# Patient Record
Sex: MALE | Race: WHITE | NOT HISPANIC OR LATINO | URBAN - METROPOLITAN AREA
[De-identification: names, ages, dates, MRNs, and addresses within clinical notes are randomized per-mention and may not be internally consistent; named-entity substitution may affect disease eponyms.]

---

## 2017-05-16 ENCOUNTER — HISTORICAL (OUTPATIENT)
Dept: RADIOLOGY | Facility: HOSPITAL | Age: 57
End: 2017-05-16

## 2017-06-02 ENCOUNTER — HISTORICAL (OUTPATIENT)
Dept: SURGERY | Facility: HOSPITAL | Age: 57
End: 2017-06-02

## 2018-05-02 ENCOUNTER — HISTORICAL (OUTPATIENT)
Dept: LAB | Facility: HOSPITAL | Age: 58
End: 2018-05-02

## 2018-09-24 ENCOUNTER — HISTORICAL (OUTPATIENT)
Dept: RADIOLOGY | Facility: HOSPITAL | Age: 58
End: 2018-09-24

## 2018-09-28 ENCOUNTER — HISTORICAL (OUTPATIENT)
Dept: RADIOLOGY | Facility: HOSPITAL | Age: 58
End: 2018-09-28

## 2021-10-27 ENCOUNTER — HISTORICAL (OUTPATIENT)
Dept: ADMINISTRATIVE | Facility: HOSPITAL | Age: 61
End: 2021-10-27

## 2024-02-15 ENCOUNTER — RESEARCH ENCOUNTER (OUTPATIENT)
Dept: RESEARCH | Facility: HOSPITAL | Age: 64
End: 2024-02-15
Payer: MEDICARE

## 2024-02-15 DIAGNOSIS — Z00.6 RESEARCH SUBJECT: Primary | ICD-10-CM

## 2024-02-15 NOTE — RESEARCH
Sponsor: [employer]     Study Title/IRB Number: [id number]    Principle Investigator: Dr. Eusebio Joseph    Patient eligibility was checked prior to enrollment in the study. Patient met the following inclusion and exclusion criteria:     INCLUSION CRITERIA  Participant age is 50 years or older at the time of signing the Informed Consent form  Participant is capable of giving signed Informed Consent, which includes compliance with the requirements and restrictions in the informed consent form and the protocol    EXCLUSION CRITERIA  Participant is undergoing or referred for diagnostic evaluation due to clinical suspicion for cancer  Participant has a personal history of invasive or hematologic malignancy, diagnosed within the last 3 years prior to expected enrollment date or diagnosed greater than 3 years prior to expected enrollment date and never treated  Participant has had definitive treatment for invasive or hematologic malignancy within the 3 years prior to expected enrollment date  Participant is not able to comply with protocol procedures  Participant is not a current patient at a participating center  Participant is currently enrolled or was previously enrolled in another [employer]-sponsored study  Participant is current or previous employee/contractor of [employer]  Participant is currently pregnant (by participant's self-report of pregnancy status)    Prior to the Informed Consent (IC) being signed, or any study protocol required data collection, testing, procedure, or intervention being performed, the following was done and/or discussed:  Patient was given a copy of the IC for review   Purpose of the study and qualifications to participate   Study design, Follow up schedule, and tests or procedures done at each visit  Confidentiality and HIPAA Authorization for Release of Medical Records for the research trial/ subject's rights/research related injury  Risk, Benefits, Alternative Treatments, Compensation and  Costs  Participation in the research trial is voluntary and patient may withdraw at anytime  Contact information for study related questions    Patient verbalizes understanding of the above: Yes  Contact information for CRC and PI given to patient: Yes  Patient able to adequately summarize: the purpose of the study, the risks associated with the study, and all procedures, testing, and follow-ups associated with the study: Yes    Patient signed the informed consent form for the research study with an IRB approval date of 4/25/2022. Each page of the consent form was reviewed with patient and all questions answered satisfactorily.   Patient received a copy of the consent form. The original consent was scanned into electronic medical records.    Anthropometric Data    Participant is a 63 y.o., White, Not  or /a, male  Participant height:   02/15/24         5'10  Participant weight:   02/15/24           210 lbs    Smoking History and Alcohol use     Please indicate whether the participant smoked at least 100 cigarettes in their lifetime:   Yes  Please indicate whether the participant is a current smoker:  No  Age participant started smoking cigarettes:  13 years old  Age participant stopped smoking cigarettes:  18 years old  During their time as a smoker, please indicate if the participant stopped smoking for a month or more:  36 months  Please indicate how many cigarettes per day did the participant smoke on average for the majority of their time as a smoker: Blank single: 10 cigarettes per day    During the last 12 months, how often did the participant have any kind of drink containing alcohol? Count as one drink a 12 ounce can or glass of beer, a 5 ounce glass of wine, or a drink containing 1 shot of liquor. Once a week}  During the last 12 months, how many drinks did the participant have on days when they drank alcohol?5 or more drinks per day    Blood Draw    Following IC being signed and prior to  blood draw, patient completed all baseline/pretest questionnaires. The following specimens were collected from the pt at the time of this encounter via peripheral blood draw.    Blood draw location: Left Arm  Needle used: 21 gauge butterfly needle  Blood draw amount: 40ml  Blood draw time: 12:51 2/15/2024

## 2024-02-26 ENCOUNTER — TELEPHONE (OUTPATIENT)
Dept: RESEARCH | Facility: HOSPITAL | Age: 64
End: 2024-02-26
Payer: MEDICARE

## 2024-02-26 NOTE — TELEPHONE ENCOUNTER
[facility] Pathfinder 2022.003    [facility] ID: [id number]     Results the [facility] Cancer Early Detection test and were reviewed by PI Dr Joseph. Patient was called and notified of test results, there was no signal detected.    Patient reminded, this was a screening test, so we still highly encourage them to continue other normal health screenings  and to continue to adhere to guideline-recommended cancer screening.    Patient was asked about completing 30 day questionnaire online and was agreeable.

## 2024-04-26 ENCOUNTER — TELEPHONE (OUTPATIENT)
Dept: RESEARCH | Facility: HOSPITAL | Age: 64
End: 2024-04-26
Payer: MEDICARE

## 2024-08-13 ENCOUNTER — OFFICE VISIT (OUTPATIENT)
Dept: URGENT CARE | Facility: CLINIC | Age: 64
End: 2024-08-13
Payer: MEDICARE

## 2024-08-13 VITALS
DIASTOLIC BLOOD PRESSURE: 70 MMHG | HEART RATE: 97 BPM | TEMPERATURE: 98 F | HEIGHT: 70 IN | WEIGHT: 215 LBS | RESPIRATION RATE: 18 BRPM | BODY MASS INDEX: 30.78 KG/M2 | OXYGEN SATURATION: 97 % | SYSTOLIC BLOOD PRESSURE: 139 MMHG

## 2024-08-13 DIAGNOSIS — M25.511 CHRONIC PAIN OF BOTH SHOULDERS: Primary | ICD-10-CM

## 2024-08-13 DIAGNOSIS — G89.29 CHRONIC PAIN OF BOTH SHOULDERS: Primary | ICD-10-CM

## 2024-08-13 DIAGNOSIS — M25.512 CHRONIC PAIN OF BOTH SHOULDERS: Primary | ICD-10-CM

## 2024-08-13 PROCEDURE — 99203 OFFICE O/P NEW LOW 30 MIN: CPT | Mod: ,,,

## 2024-08-13 RX ORDER — FAMOTIDINE 40 MG/1
40 TABLET, FILM COATED ORAL
COMMUNITY
Start: 2024-08-10

## 2024-08-13 RX ORDER — NABUMETONE 500 MG/1
500 TABLET, FILM COATED ORAL 2 TIMES DAILY PRN
Qty: 20 TABLET | Refills: 0 | Status: SHIPPED | OUTPATIENT
Start: 2024-08-13 | End: 2024-08-23

## 2024-08-13 RX ORDER — AMLODIPINE AND BENAZEPRIL HYDROCHLORIDE 10; 20 MG/1; MG/1
1 CAPSULE ORAL
COMMUNITY
Start: 2024-07-22

## 2024-08-13 RX ORDER — TIZANIDINE 2 MG/1
2 TABLET ORAL EVERY 12 HOURS PRN
Qty: 6 TABLET | Refills: 0 | Status: SHIPPED | OUTPATIENT
Start: 2024-08-13 | End: 2024-08-16

## 2024-08-13 NOTE — PATIENT INSTRUCTIONS
Referral has been placed physical therapy.    Take prescription with food.  Take tizanidine/Zanaflex nightly as it may make you drowsy  Take medications only as prescribed as they may cause sedation.  Get plenty of rest.  Follow with your primary care provider.  Follow-up with physical therapy as ordered, Orthopedics as needed.  Call back if referral as needed to an orthopedic specialist.    Go to the Emergency Department with any significant change or worsening symptoms.

## 2024-08-13 NOTE — PROGRESS NOTES
"Subjective:      Patient ID: Addison Spencer is a 64 y.o. male.    Vitals:  height is 5' 10" (1.778 m) and weight is 97.5 kg (215 lb). His oral temperature is 98.4 °F (36.9 °C). His blood pressure is 139/70 and his pulse is 97. His respiration is 18 and oxygen saturation is 97%.     Chief Complaint: Shoulder Pain     Patient is a 64 y.o. male who presents to urgent care with complaints of bilat shoulder pain x4-5 months.  Patient does report history of previous likely rotator cuff injury years ago.  Patient has seen PT for his knees after bilateral knee replacements and went to his PT office requesting treatment for his bilateral shoulder pain.  He reports he was told to have a referral order placed in the computer.  He would like to have dry needling done by the physical therapist.  Patient reports his pain is intermittent, worse when lying in bed at night.  He finds it is primarily anterior shoulder bilaterally to the muscles of the upper arms.  He does have some intermittent decreased range of motion due to pain when trying to  a grab certain objects, moving in certain positions.  He reports this reproducible pain with range of motion is not always consistent.  Alleviating factors include alieve with moderate amount of relief. Patient denies radiating pain, numbness, tingling, focal weakness, injury or trauma. Pt states he was painting "a while back and it may have started this".  Denies any chest pain, shortness for breath.  Patient reports he is here specifically for referral to PT, he declines want for any steroid injections or drowsy pain medications.  He reports pain seems to be worsening, he is unable sleep at night due to the pain in his muscles near bilateral shoulders.        Skin:  Negative for erythema.      Objective:     Physical Exam   Constitutional: He is oriented to person, place, and time. He appears well-developed.   HENT:   Head: Normocephalic and atraumatic. Head is without abrasion, " without contusion and without laceration.   Ears:   Right Ear: External ear normal.   Left Ear: External ear normal.   Nose: Nose normal.   Mouth/Throat: Oropharynx is clear and moist and mucous membranes are normal.   Eyes: Conjunctivae, EOM and lids are normal. Pupils are equal, round, and reactive to light.   Neck: Trachea normal and phonation normal. Neck supple.   Cardiovascular: Normal rate, regular rhythm and normal heart sounds.   Pulmonary/Chest: Effort normal and breath sounds normal. No stridor. No respiratory distress.   Musculoskeletal: Normal range of motion.         General: Tenderness present. No deformity or signs of injury. Normal range of motion.      Right shoulder: He exhibits tenderness. He exhibits normal range of motion, no swelling, no deformity, normal pulse and normal strength.      Left shoulder: He exhibits tenderness. He exhibits normal range of motion, no swelling, no deformity, normal pulse and normal strength.        Arms:       Comments: There is tenderness to palpation to bilateral anterior shoulder, muscular tenderness, there is no significant decreased range of motion however patient does describe some reproducibility of pain with range of motion   Neurological: He is alert and oriented to person, place, and time.   Skin: Skin is warm, dry, intact and no rash. Capillary refill takes less than 2 seconds. No abrasion, No burn, No bruising, No erythema and No ecchymosis   Psychiatric: His speech is normal and behavior is normal. Judgment and thought content normal.   Nursing note and vitals reviewed.      Assessment:     1. Chronic pain of both shoulders        Plan:       Chronic pain of both shoulders  -     Ambulatory referral/consult to Physical/Occupational Therapy  -     nabumetone (RELAFEN) 500 MG tablet; Take 1 tablet (500 mg total) by mouth 2 (two) times daily as needed for Pain.  Dispense: 20 tablet; Refill: 0  -     tiZANidine (ZANAFLEX) 2 MG tablet; Take 1 tablet (2 mg  total) by mouth every 12 (twelve) hours as needed (pain, muscle spasms).  Dispense: 6 tablet; Refill: 0      Discussed temporary treatment with anti-inflammatories, no known kidney disease.  Will send in short-term Relafen due to history of reflux.  Patient reports muscular pain prevents him from sleeping at night.  No relief of the leave.   Will send in short-term course of Zanaflex, less drowsy muscle relaxer.    Take prescription with food.  Take tizanidine/Zanaflex nightly as it may make you drowsy  Take medications only as prescribed as they may cause sedation.  Get plenty of rest.  Follow with your primary care provider.  Follow-up with physical therapy as ordered, Orthopedics as needed.  Call back if referral as needed to an orthopedic specialist.    Go to the Emergency Department with any significant change or worsening symptoms.

## 2025-02-18 ENCOUNTER — TELEPHONE (OUTPATIENT)
Dept: RESEARCH | Facility: HOSPITAL | Age: 65
End: 2025-02-18
Payer: MEDICARE

## 2025-02-18 NOTE — TELEPHONE ENCOUNTER
[facility]   [facility]    Contacted pt in order to complete a 1-yr cancer assessment since the date of enrollment.    No cancer screening was noted.